# Patient Record
Sex: MALE
[De-identification: names, ages, dates, MRNs, and addresses within clinical notes are randomized per-mention and may not be internally consistent; named-entity substitution may affect disease eponyms.]

---

## 2020-09-04 ENCOUNTER — DASHBOARD ENCOUNTERS (OUTPATIENT)
Age: 40
End: 2020-09-04

## 2020-09-04 ENCOUNTER — OFFICE VISIT (OUTPATIENT)
Dept: URBAN - METROPOLITAN AREA CLINIC 54 | Facility: CLINIC | Age: 40
End: 2020-09-04
Payer: SELF-PAY

## 2020-09-04 DIAGNOSIS — B18.1 CARRIER OF HEPATITIS B: ICD-10-CM

## 2020-09-04 DIAGNOSIS — Z72.0 TOBACCO ABUSE: ICD-10-CM

## 2020-09-04 DIAGNOSIS — K85.20 ALCOHOLIC PANCREATITIS: ICD-10-CM

## 2020-09-04 PROCEDURE — 99203 OFFICE O/P NEW LOW 30 MIN: CPT | Performed by: INTERNAL MEDICINE

## 2020-09-04 RX ORDER — HYDROCODONE BITARTRATE AND ACETAMINOPHEN 5; 325 MG/1; MG/1
1 TABLET AS NEEDED TABLET ORAL
Status: ACTIVE | COMMUNITY
Start: 2020-09-04

## 2020-09-04 RX ORDER — CARVEDILOL 25 MG/1
1 TABLET WITH FOOD TABLET, FILM COATED ORAL TWICE A DAY
Qty: 60 | Status: ACTIVE | COMMUNITY
Start: 2020-09-04

## 2020-09-04 RX ORDER — MIRTAZAPINE 15 MG/1
1 TABLET AT BEDTIME TABLET, FILM COATED ORAL ONCE A DAY
Qty: 30 | Status: ACTIVE | COMMUNITY
Start: 2020-09-04

## 2020-09-04 RX ORDER — TRAMADOL HYDROCHLORIDE 50 MG/1
1 TABLET AS NEEDED TABLET, FILM COATED ORAL BID
Qty: 20 TABLET | Refills: 0 | OUTPATIENT
Start: 2020-09-04 | End: 2020-09-14

## 2020-09-04 NOTE — HPI-TODAY'S VISIT:
Patient is a 39 yo man referred for above issues. He was recently admitted for ? ETOH induced pancreatitis x 5 days at his local hospital (no records available). Etiology appears to be ETOH induced. RUQ USG showed mild swelling of the HOP with no hepatomegaly or gallstones. His WBC count was 18 K. He has no prior history of pancreatic disease. He was drinking around > 30 drinks per week but has currently cut down. LFTs' elevated in hepatocellular pattern. Viral markers negative except for HBcIgM +. He has history of cocaine, meth use in past. He currently uses [facility]J. No signs of advanced liver disease.

## 2020-09-04 NOTE — PHYSICAL EXAM GASTROINTESTINAL
Abdomen , soft, tender epigastrium,  nondistended , no guarding or rigidity , no masses palpable , normal bowel sounds , Liver and Spleen , no hepatomegaly present , no hepatosplenomegaly , liver nontender , spleen not palpable